# Patient Record
Sex: FEMALE | Race: WHITE
[De-identification: names, ages, dates, MRNs, and addresses within clinical notes are randomized per-mention and may not be internally consistent; named-entity substitution may affect disease eponyms.]

---

## 2021-02-25 ENCOUNTER — HOSPITAL ENCOUNTER (OUTPATIENT)
Dept: HOSPITAL 50 - VM.MS | Age: 51
Setting detail: OBSERVATION
LOS: 1 days | Discharge: HOME | End: 2021-02-26
Attending: PHYSICIAN ASSISTANT | Admitting: PHYSICIAN ASSISTANT
Payer: MEDICAID

## 2021-02-25 DIAGNOSIS — L40.9: ICD-10-CM

## 2021-02-25 DIAGNOSIS — K70.31: ICD-10-CM

## 2021-02-25 DIAGNOSIS — E87.6: Primary | ICD-10-CM

## 2021-02-25 DIAGNOSIS — Z79.899: ICD-10-CM

## 2021-02-25 DIAGNOSIS — M79.606: ICD-10-CM

## 2021-02-25 DIAGNOSIS — Z20.822: ICD-10-CM

## 2021-02-25 PROCEDURE — G0379 DIRECT REFER HOSPITAL OBSERV: HCPCS

## 2021-02-25 PROCEDURE — U0002 COVID-19 LAB TEST NON-CDC: HCPCS

## 2021-02-25 PROCEDURE — G0378 HOSPITAL OBSERVATION PER HR: HCPCS

## 2021-02-25 RX ADMIN — TRIAMCINOLONE ACETONIDE SCH APPLIC: 1 CREAM TOPICAL at 20:19

## 2021-02-25 RX ADMIN — POTASSIUM CHLORIDE SCH MEQ: 750 TABLET, FILM COATED, EXTENDED RELEASE ORAL at 17:10

## 2021-02-25 RX ADMIN — POTASSIUM CHLORIDE SCH MLS/HR: 29.8 INJECTION, SOLUTION INTRAVENOUS at 18:17

## 2021-02-25 RX ADMIN — POTASSIUM CHLORIDE SCH MLS/HR: 29.8 INJECTION, SOLUTION INTRAVENOUS at 17:04

## 2021-02-25 NOTE — PCM.HP.2
H&P History of Present Illness





- General


Date of Service: 02/25/21


Admit Problem/Dx: 


                           Admission Diagnosis/Problem





Admission Diagnosis/Problem      Hypokalemia








Source of Information: Patient


History Limitations: Reports: No Limitations





- History of Present Illness


Onset of Symptoms: Reports: Gradual


Duration of Symptoms: Reports: Chronic, Getting Worse


Location: Reports: Other


Quality: Reports: Ache


Severity: Severe


Improves with: Reports: None


Worsens with: Reports: None


Context: Reports: Other


Associated Symptoms: Reports: Loss of Appetite, Malaise, Nausea/Vomiting, Rash





- Related Data


Allergies/Adverse Reactions: 


                                    Allergies











Allergy/AdvReac Type Severity Reaction Status Date / Time


 


No Known Allergies Allergy   Verified 02/04/20 13:41











Home Medications: 


                                    Home Meds





Ibuprofen [Motrin] 600 mg PO Q4HR PRN 02/04/20 [History]


Triamcinolone Acetonide [Kenalog 0.1% Crm] 1 applic BID PRN 02/04/20 [History]











Past Medical History


Genitourinary History: Reports: Other (See Below)


Other Genitourinary History: dysfuncitonal uterine bleeding


Psychiatric History: Reports: Addiction


Dermatologic History: Reports: Other (See Below)


Other Dermatologic History: rash





Social & Family History





- Family History


Cardiac: Reports: Hypertension, Pacemaker


Respiratory: Reports: Sleep Apnea





- Living Situation & Occupation


Living situation: Reports: 


Occupation: Unemployed (living with parents currently)





H&P Review of Systems





- Review of Systems:


Review Of Systems: See Below


General: Reports: Malaise, Fatigue, Decreased Appetite, Weight Loss


HEENT: Reports: No Symptoms


Pulmonary: Reports: No Symptoms


Cardiovascular: Reports: No Symptoms


Gastrointestinal: Reports: Abdominal Pain, Anorexia, Decreased Appetite, 

Distension, Nausea


Genitourinary: Reports: No Symptoms


Musculoskeletal: Reports: Leg Pain


Skin: Reports: Jaundice, Pruritis, Rash


Psychiatric: Reports: Other (alcohol abuse)


Neurological: Reports: No Symptoms


Hematologic/Lymphatic: Reports: Easy Bleeding, Other (epistaxis)


Immunologic: Reports: No Symptoms





Exam





- Exam


Exam: See Below





- Vital Signs


Vital Signs: 


                                Last Vital Signs











Temp  36.7 C   02/25/21 16:21


 


Pulse  88   02/25/21 16:21


 


Resp  16   02/25/21 16:21


 


BP  105/60   02/25/21 16:21


 


Pulse Ox  100   02/25/21 16:21











Weight: 55.61 kg





- Exam


General: Alert, Oriented, Cooperative


HEENT: Conjunctiva Clear, EACs Clear, Hearing Intact


Neck: Supple, Trachea Midline


Lungs: Clear to Auscultation, Normal Respiratory Effort


Cardiovascular: Regular Rate, Regular Rhythm, Normal S1, Normal S2


GI/Abdominal Exam: Normal Bowel Sounds, Soft, Distended, Hepatomegaly


Extremities: Normal Inspection, Normal Range of Motion, Non-Tender, No Pedal 

Edema, Normal Capillary Refill, Mottled


Peripheral Pulses: 2+: Dorsalis Pedis (L), Dorsalis Pedis (R)


Skin: Warm, Dry, Intact, Rash


Neurological: Normal Gait, Normal Speech


Neuro Extensive - Mental Status: Alert, Oriented x3, Normal Mood/Affect, Normal 

Cognition, Memory Intact


Neuro Extensive - Motor, Sensory, Reflexes: Normal Gait


Psychiatric: Alert, Normal Affect, Normal Mood





Sepsis Event Note





- Focused Exam


Vital Signs: 


                                   Vital Signs











  Temp Pulse Resp BP Pulse Ox


 


 02/25/21 16:21  36.7 C  88  16  105/60  100














*Q Meaningful Use (ADM)





- VTE *Q


VTE Anticoagulation Contraindications: Med/TX Not Indicated/Need





- Problem List


(1) Hypokalemia


SNOMED Code(s): 58187762


   ICD Code: E87.6 - HYPOKALEMIA   Status: Acute   Priority: High   Current 

Visit: Yes   





(2) Cirrhosis of liver


SNOMED Code(s): 96754862


   ICD Code: K74.60 - UNSPECIFIED CIRRHOSIS OF LIVER   Status: Chronic   

Priority: Medium   Current Visit: Yes   


Qualifiers: 


   Hepatic cirrhosis type: alcoholic cirrhosis   Ascites presence: with ascites 

  Qualified Code(s): K70.31 - Alcoholic cirrhosis of liver with ascites   





(3) Leg pain, diffuse


SNOMED Code(s): 60624957


   ICD Code: M79.606 - PAIN IN LEG, UNSPECIFIED   Status: Chronic   Priority: 

Medium   Current Visit: Yes   


Qualifiers: 


   Laterality: unspecified laterality   Qualified Code(s): M79.606 - Pain in 

leg, unspecified   





(4) Psoriasis


SNOMED Code(s): 4749169


   ICD Code: L40.9 - PSORIASIS, UNSPECIFIED   Status: Chronic   Priority: Low   

Current Visit: Yes   


Problem List Initiated/Reviewed/Updated: Yes


Orders Last 24hrs: 


                               Active Orders 24 hr











 Category Date Time Status


 


 Patient Status [ADT] Routine ADT  02/25/21 16:15 Ordered


 


 Cardiac Monitoring [RC] CONTINUOUS Care  02/25/21 16:17 Ordered


 


 EKG 12 Lead [EKG Documentation Completion] [RC] STAT Care  02/25/21 16:21 

Ordered


 


 Notify Provider Vital Signs [RC] ASDIRECTED Care  02/25/21 16:17 Ordered


 


 Oxygen Therapy [RC] PRN Care  02/25/21 16:15 Ordered


 


 Up ad Fide [RC] ASDIRECTED Care  02/25/21 16:15 Ordered


 


 VTE/DVT Education [RC] PER UNIT ROUTINE Care  02/25/21 16:15 Ordered


 


 Vital Signs [RC] Q4H Care  02/25/21 16:15 Ordered


 


 Regular Diet [DIET] Diet  02/25/21 Dinner Ordered


 


 CBC WITH AUTO DIFF [HEME] AM Lab  02/26/21 05:11 Ordered


 


 COMPREHENSIVE METABOLIC PN,CMP [CHEM] AM Lab  02/26/21 05:11 Ordered


 


 CULTURE BLOOD [BC] Stat Lab  02/25/21 16:20 Ordered


 


 CULTURE BLOOD [BC] Stat Lab  02/25/21 16:20 Ordered


 


 POTASSIUM,K [CHEM] Timed Lab  02/25/21 22:00 Ordered


 


 Acetaminophen [TylenoL] Med  02/25/21 16:15 Ordered





 650 mg PO Q4H PRN   


 


 Folic Acid Med  02/26/21 08:00 Ordered





 1 mg PO DAILY   


 


 Gabapentin [Neurontin] Med  02/25/21 20:00 Ordered





 300 mg PO BID   


 


 Nicotine [Habitrol] Med  02/26/21 08:00 Ordered





 21 mg TRDERM DAILY   


 


 Potassium Chloride Riders [KCL in Water 20 MEQ/50 ML] Med  02/25/21 16:30 

Ordered





 20 meq   





 Premix Bag 1 bag   





 IV Q2H   


 


 Potassium Chloride [Klor-Con 10] Med  02/25/21 18:00 Ordered





 40 meq PO BIDMEALS   


 


 Sodium Chloride 0.9% @ 150 MLS/HR (1000ml) Med  02/25/21 16:30 Ordered





 Sodium Chloride 0.9% [Normal Saline] 1,000 ml   





 IV ASDIRECTED   


 


 Sodium Chloride 0.9% [Saline Flush] Med  02/25/21 16:15 Ordered





 10 ml FLUSH ASDIRECTED PRN   


 


 Thiamine [Vitamin B-1] Med  02/25/21 20:00 Ordered





 100 mg PO BEDTIME   


 


 Triamcinolone Acetonide [Triamcinolone Acetonide 0.1% Med  02/25/21 20:00 

Ordered





 Crm]   





 15 gm TOP BID   


 


 Anticoagulation Contraindications VTE [AST] Per Unit Oth  02/25/21 16:15 

Ordered





 Routine   


 


 Blood Culture x2 Reflex Set [OM.PC] Stat Ot  02/25/21 16:15 Ordered


 


 Saline Lock Insert [OM.PC] Routine Ot  02/25/21 16:15 Ordered


 


 Resuscitation Status Routine Resus Stat  02/25/21 16:15 Ordered








                                Medication Orders





Acetaminophen (Tylenol)  650 mg PO Q4H PRN


   PRN Reason: Pain (Mild 1-3)/fever


Folic Acid (Folic Acid)  1 mg PO DAILY [facility]


   Last Admin: 02/25/21 17:10 Dose:  1 mg


   Documented by: 


Gabapentin (Neurontin)  300 mg PO BID [facility]


Potassium Chloride 20 meq/ (Premix)  50 mls @ 50 mls/hr IV Q2H [facility]


   Stop: 02/25/21 19:29


   Last Admin: 02/25/21 17:04 Dose:  50 mls/hr


   Documented by: 


Sodium Chloride (Normal Saline)  1,000 mls @ 150 mls/hr IV ASDIRECTED [facility]


   Last Admin: 02/25/21 16:45  Dose: 150 mls/hr


   Documented by: DEPRROW


Nicotine (Habitrol)  21 mg TRDERM DAILY@1800 [facility]


   Last Admin: 02/25/21 17:03 Dose:  21 mg


   Documented by: 


Potassium Chloride (Klor-Con 10)  40 meq PO BIDMEALS [facility]


   Last Admin: 02/25/21 17:10 Dose:  40 meq


   Documented by: 


Sodium Chloride (Saline Flush)  10 ml FLUSH ASDIRECTED PRN


   PRN Reason: Keep Vein Open


Thiamine HCl (Vitamin B-1)  100 mg PO DAILY [facility]


   Last Admin: 02/25/21 17:10 Dose:  100 mg


   Documented by: 


Triamcinolone Acetonide (Triamcinolone Acetonide 0.1% Crm)  0 gm TOP BID [facility]








Assessment/Plan Comment:: 





She is admitted Observation Status to replace potassium and monitor her 

telemetry while doing so. 


KCL 20 mEq IV x 2 tonight.


KCL 40 mEq orally BID (2 doses tonight).


Check Potassium level at 10 pm and repeat lab in AM. 


EKG


Start Gabapentin for leg pain. 


Blood cultures.


Get RUQ US on out pt if not available now. 


I contacted [facility] who had no further recommendations. Stop a

lcohol and recheck lab in 2 weeks. 


Anticipate DC tomorrow on oral K.

## 2021-02-25 NOTE — PCM.HP.2
H&P History of Present Illness





- General


Date of Service: 02/25/21


Admit Problem/Dx: 


                           Admission Diagnosis/Problem





Admission Diagnosis/Problem      Hypokalemia








Source of Information: Patient


History Limitations: Reports: No Limitations





- History of Present Illness


Initial Comments - Free Text/Narative: 





Jazmín is a 50 yr old female who came into the clinic today for a wellness exam. S

he had complaints of: bilateral leg aching keeping her awake at night, bloating 

to abdomen with loss of appetite and post prandial nausea, pruritic rash to 

back, and weight loss. She has known alcoholism and she reports stopping 

drinking 2 weeks ago. On exam she has jaundice, hepatomegaly, ascites, and 

dermatitis. Lab studies show: PT 15.5, INR 1.4, Lipase 172, Blood Alcohol 0, 

Gluc 123, K 2.5, Na 137, Albumin 2.8, Alk Phos 228, , ALT 22, Total Bili 

7.6, renal function WNL, Mag 1.7, WBC 11.1, RBC 2.8, Hgb 10.3, .7, MCHC 

34. Pending labs include GGTP, Vit B12, Ferritin, TSH, Vit D. 


She moved to [place] from [place] to live with her parents. Today she 

indicates she is moving to [place] to live with her sister. She hopes to work in

the insurance industry. She has a dog she is concerned about his care as he is 

over 80 lbs in the care of her elderly parents tonight. 


Onset of Symptoms: Reports: Gradual


Duration of Symptoms: Reports: Chronic, Getting Worse


Location: Reports: Other


Quality: Reports: Ache


Severity: Moderate


Improves with: Reports: None


Worsens with: Reports: None


Context: Reports: Other


Associated Symptoms: Reports: Loss of Appetite, Nausea/Vomiting, Rash





- Related Data


Allergies/Adverse Reactions: 


                                    Allergies











Allergy/AdvReac Type Severity Reaction Status Date / Time


 


No Known Allergies Allergy   Verified 02/04/20 13:41











Home Medications: 


                                    Home Meds





Ibuprofen [Motrin] 600 mg PO Q4HR PRN 02/04/20 [History]


Triamcinolone Acetonide [Kenalog 0.1% Crm] 1 applic BID PRN 02/04/20 [History]











Past Medical History


Genitourinary History: Reports: Other (See Below)


Other Genitourinary History: dysfuncitonal uterine bleeding


Psychiatric History: Reports: Addiction


Dermatologic History: Reports: Other (See Below)


Other Dermatologic History: rash





Social & Family History





- Family History


Cardiac: Reports: Hypertension, Pacemaker


Respiratory: Reports: Sleep Apnea





- Living Situation & Occupation


Living situation: Reports: 


Occupation: Unemployed (living with parents currently)





H&P Review of Systems





- Review of Systems:


Review Of Systems: See Below


General: Reports: Malaise, Decreased Appetite, Weight Loss


HEENT: Reports: No Symptoms


Pulmonary: Reports: No Symptoms


Cardiovascular: Reports: No Symptoms


Gastrointestinal: Reports: Abdominal Pain, Decreased Appetite, Distension, 

Nausea


Genitourinary: Reports: No Symptoms


Musculoskeletal: Reports: Leg Pain


Skin: Reports: Jaundice


Psychiatric: Reports: Other (alcohol abuse, stopped 2 weeks ago)


Neurological: Reports: No Symptoms


Hematologic/Lymphatic: Reports: Other (epistaxis)


Immunologic: Reports: No Symptoms





Exam





- Exam


Exam: See Below





- Exam


General: Alert, Oriented, Cooperative


HEENT: Conjunctiva Clear, EACs Clear, Hearing Intact, Mucosa Moist & Pink


Neck: Supple, Trachea Midline


Lungs: Clear to Auscultation, Normal Respiratory Effort


Cardiovascular: Regular Rate, Regular Rhythm, Normal S1, Normal S2


GI/Abdominal Exam: Normal Bowel Sounds, Soft, Distended, Hepatomegaly


Extremities: Normal Inspection, Normal Range of Motion, Non-Tender, No Pedal 

Edema, Normal Capillary Refill, Leg Pain, Mottled


Peripheral Pulses: 2+: Dorsalis Pedis (L), Dorsalis Pedis (R)


Skin: Warm, Dry, Intact


Neurological: Normal Gait, Normal Speech


Neuro Extensive - Mental Status: Alert, Oriented x3, Normal Mood/Affect, Normal 

Cognition, Memory Intact


Neuro Extensive - Motor, Sensory, Reflexes: Normal Gait





*Q Meaningful Use (ADM)





- VTE *Q


VTE Anticoagulation Contraindications: Med/TX Not Indicated/Need





- Problem List


(1) Hypokalemia


SNOMED Code(s): 69627320


   ICD Code: E87.6 - HYPOKALEMIA   Status: Acute   Priority: High   Current 

Visit: Yes   





(2) Cirrhosis of liver


SNOMED Code(s): 58447132


   ICD Code: K74.60 - UNSPECIFIED CIRRHOSIS OF LIVER   Status: Chronic   

Priority: Medium   Current Visit: Yes   


Qualifiers: 


   Hepatic cirrhosis type: alcoholic cirrhosis   Ascites presence: with ascites 

  Qualified Code(s): K70.31 - Alcoholic cirrhosis of liver with ascites   





(3) Leg pain, diffuse


SNOMED Code(s): 42803664


   ICD Code: M79.606 - PAIN IN LEG, UNSPECIFIED   Status: Chronic   Priority: 

Medium   Current Visit: Yes   


Qualifiers: 


   Laterality: unspecified laterality   Qualified Code(s): M79.606 - Pain in 

leg, unspecified   





(4) Psoriasis


SNOMED Code(s): 3142942


   ICD Code: L40.9 - PSORIASIS, UNSPECIFIED   Status: Chronic   Priority: Low   

Current Visit: Yes   


Problem List Initiated/Reviewed/Updated: Yes


Orders Last 24hrs: 


                               Active Orders 24 hr











 Category Date Time Status


 


 Patient Status [ADT] Routine ADT  02/25/21 16:15 Ordered


 


 Cardiac Monitoring [RC] CONTINUOUS Care  02/25/21 16:17 Ordered


 


 EKG 12 Lead [EKG Documentation Completion] [RC] STAT Care  02/25/21 16:21 

Ordered


 


 Notify Provider Vital Signs [RC] ASDIRECTED Care  02/25/21 16:17 Ordered


 


 Oxygen Therapy [RC] PRN Care  02/25/21 16:15 Ordered


 


 Up ad Fide [RC] ASDIRECTED Care  02/25/21 16:15 Ordered


 


 VTE/DVT Education [RC] PER UNIT ROUTINE Care  02/25/21 16:15 Ordered


 


 Vital Signs [RC] Q4H Care  02/25/21 16:15 Ordered


 


 Regular Diet [DIET] Diet  02/25/21 Dinner Ordered


 


 CBC WITH AUTO DIFF [HEME] AM Lab  02/26/21 05:11 Ordered


 


 COMPREHENSIVE METABOLIC PN,CMP [CHEM] AM Lab  02/26/21 05:11 Ordered


 


 CULTURE BLOOD [BC] Stat Lab  02/25/21 16:20 Ordered


 


 CULTURE BLOOD [BC] Stat Lab  02/25/21 16:20 Ordered


 


 POTASSIUM,K [CHEM] Timed Lab  02/25/21 22:00 Ordered


 


 Acetaminophen [TylenoL] Med  02/25/21 16:15 Ordered





 650 mg PO Q4H PRN   


 


 Folic Acid Med  02/26/21 08:00 Ordered





 1 mg PO DAILY   


 


 Gabapentin [Neurontin] Med  02/25/21 20:00 Ordered





 300 mg PO BID   


 


 Nicotine [Habitrol] Med  02/26/21 08:00 Ordered





 21 mg TRDERM DAILY   


 


 Potassium Chloride Riders [KCL in Water 20 MEQ/50 ML] Med  02/25/21 16:30 

Ordered





 20 meq   





 Premix Bag 1 bag   





 IV Q2H   


 


 Potassium Chloride [Klor-Con 10] Med  02/25/21 18:00 Ordered





 40 meq PO BIDMEALS   


 


 Sodium Chloride 0.9% @ 150 MLS/HR (1000ml) Med  02/25/21 16:30 Ordered





 Sodium Chloride 0.9% [Normal Saline] 1,000 ml   





 IV ASDIRECTED   


 


 Sodium Chloride 0.9% [Saline Flush] Med  02/25/21 16:15 Ordered





 10 ml FLUSH ASDIRECTED PRN   


 


 Thiamine [Vitamin B-1] Med  02/25/21 20:00 Ordered





 100 mg PO BEDTIME   


 


 Triamcinolone Acetonide [Triamcinolone Acetonide 0.1% Med  02/25/21 20:00 

Ordered





 Crm]   





 15 gm TOP BID   


 


 Anticoagulation Contraindications VTE [AST] Per Unit Oth  02/25/21 16:15 

Ordered





 Routine   


 


 Blood Culture x2 Reflex Set [OM.PC] Stat Ot  02/25/21 16:15 Ordered


 


 Saline Lock Insert [OM.PC] Routine Oth  02/25/21 16:15 Ordered


 


 Resuscitation Status Routine Resus Stat  02/25/21 16:15 Ordered








                                Medication Orders





Acetaminophen (Tylenol)  650 mg PO Q4H PRN


   PRN Reason: Pain (Mild 1-3)/fever


Folic Acid (Folic Acid)  1 mg PO DAILY [facility]


Gabapentin (Neurontin)  300 mg PO BID [facility]


Potassium Chloride 20 meq/ (Premix)  50 mls @ 50 mls/hr IV Q2H [facility]


   Stop: 02/25/21 19:29


Sodium Chloride (Normal Saline)  1,000 mls @ 150 mls/hr IV ASDIRECTED [facility]


   Last Admin: 02/25/21 16:45  Dose: 150 mls/hr


   Documented by: DEPRROW


Nicotine (Habitrol)  21 mg TRDERM DAILY@1800 [facility]


Potassium Chloride (Klor-Con 10)  40 meq PO BIDMEALS [facility]


Sodium Chloride (Saline Flush)  10 ml FLUSH ASDIRECTED PRN


   PRN Reason: Keep Vein Open


Thiamine HCl (Vitamin B-1)  100 mg PO DAILY [facility]


Triamcinolone Acetonide (Triamcinolone Acetonide 0.1% Crm)  0 gm TOP BID [facility]








Assessment/Plan Comment:: 





She is admitted Observation Status to replace potassium and monitor her 

telemetry while doing so. 


KCL 20 mEq IV x 2 tonight.


KCL 40 mEq orally BID (2 doses tonight).


Check Potassium level at 10 pm and repeat lab in AM. 


EKG


Start Gabapentin for leg pain. 


Blood cultures.


Get RUQ US on out pt if not available now. 


I contacted [facility] who had no further recommendations. Stop 

alcohol and recheck lab in 2 weeks. 


Anticipate DC tomorrow on oral K. 








- Mortality Measure


Prognosis:: Good

## 2021-02-26 LAB
ANION GAP SERPL CALC-SCNC: 11 MMOL/L (ref 5–15)
CHLORIDE SERPL-SCNC: 102 MMOL/L (ref 98–107)
SODIUM SERPL-SCNC: 137 MMOL/L (ref 136–145)

## 2021-02-26 RX ADMIN — POTASSIUM CHLORIDE SCH MEQ: 750 TABLET, FILM COATED, EXTENDED RELEASE ORAL at 07:43

## 2021-02-26 RX ADMIN — TRIAMCINOLONE ACETONIDE SCH APPLIC: 1 CREAM TOPICAL at 07:43

## 2021-02-26 NOTE — PCM.DCSUM1
**Discharge Summary





- Hospital Course


Free Text/Narrative:: 





Jazmín was admitted overnight with hypokalemia, alcoholic cirrhosis, leg pain, and 

itching. She was given Potassium both infusion and orally. Her potassium chrissy to

3.5 at 10 pm and 4.0 this morning. She was started on Gabapentin for leg pain 

which she felt was helpful. She feels she can stop alcohol use on her own. She 

indicates stopping 2 weeks ago.  did visit with her and offered 

her business card if she changes her mind about alcohol treatment. 


She will have an Abdominal US on Monday along with a repeat K level. I will see 

her in 2 weeks with repeat lab. She is referred to Gastroenterology. 


She does have anemia with macrocytosis. Ferritin and Vitamin B12 levels are 

elevated. Will get iron studies outpatient. Ammonia level drawn this morning and

pending. She is given Lactulose 15 ml daily for ascites and constipation, Ativan

10 tablets in the event of onset of withdrawal symptoms this weekend with 

instructions she needs to be seen if occurs, Potassium 10 mEq daily, and will 

increase Gabapentin to 300 mg TID. She requests a Nicoderm 24 mg/day patch to 

continue so Rx sent. She was DC to her parents care. 





HPI Initial Comments: 





Hypokalemia and alcohol cirrhosis


Brief History: Hypokalemia and alcohol cirrhosis.


Diagnosis: Stroke: No


Modified [place] Scale: No Signif.Disability Despite Sympt.Able to Carry Out 

Usual Act./Duties


Modified Vito Scale Score: 1





- Discharge Data


Discharge Date: 02/26/21


Discharge Disposition: Home, Self-Care 01


Condition: Fair





- Referral to Home Health


Primary Care Physician: 


Bobbi Montanez PA-C








- Discharge Diagnosis/Problem(s)


(1) Hypokalemia


SNOMED Code(s): 72836807


   ICD Code: E87.6 - HYPOKALEMIA   Status: Acute   Priority: High   





(2) Cirrhosis of liver


SNOMED Code(s): 92231075


   ICD Code: K74.60 - UNSPECIFIED CIRRHOSIS OF LIVER   Status: Chronic   

Priority: Medium   


Qualifiers: 


   Hepatic cirrhosis type: alcoholic cirrhosis   Ascites presence: with ascites 

 Qualified Code(s): K70.31 - Alcoholic cirrhosis of liver with ascites   





(3) Leg pain, diffuse


SNOMED Code(s): 78412502


   ICD Code: M79.606 - PAIN IN LEG, UNSPECIFIED   Status: Chronic   Priority: 

Medium   


Qualifiers: 


   Laterality: unspecified laterality   Qualified Code(s): M79.606 - Pain in 

leg, unspecified   





(4) Psoriasis


SNOMED Code(s): 0872035


   ICD Code: L40.9 - PSORIASIS, UNSPECIFIED   Status: Chronic   Priority: Low   





- Patient Instructions


Diet: Usual Diet as Tolerated


Diet, Other: Encourage high potassium diet such as orange juice, bananas, etc.


Activity: As Tolerated


Notify Provider of: Increased Pain, Nausea and/or Vomiting


Other/Special Instructions: Needs repeat potassium level on Monday at clinic.  

Needs repeat office visit in 2 weeks with full lab one day prior.  See 

Gastroenterologist in [place]. I will schedule through here.  Take Lactulose 15 ml

each day. This helps with getting bowels working and to reduce fluid in belly.  

Increase Gabapentin to 300 mg 3 times a day.  Have Ultrasound of abdomen on 

Monday at hospital.  Continue to use nicotine patches at home to avoid tobacco. 

No alcohol.  If tylenol is used, no more than 2000 mg per 24 hours.  Ativan 0.5 

mg up to 2 times a day as needed for symptoms of possible detox (jittery, heart 

racing, nervousness, etc.) If this occurs should also be seen.  Potassium 10 mEq

once a day.





- Discharge Plan


*PRESCRIPTION DRUG MONITORING PROGRAM REVIEWED*: No


*COPY OF PRESCRIPTION DRUG MONITORING REPORT IN PATIENT SHAHEED: No


Prescriptions/Med Rec: 


LORazepam [Ativan] 0.5 mg PO BID PRN #10 tablet


 PRN Reason: jittery, nervousness


Nicotine [Habitrol] 21 mg TRDERM DAILY@1800 #30 patch


Potassium Chloride [Klor-Con 10] 10 meq PO DAILY #30 tab.er


Tobacco Cessation Medication: Prescription Given


Home Medications: 


                                    Home Meds





Triamcinolone Acetonide [Kenalog 0.1% Crm] 1 applic TOP BID PRN 02/04/20 

[History]


Betamethasone Dipropionate [Diprosone 0.05% Crm] 1 applic TOP BID PRN 02/25/21 

[History]


Betamethasone Dipropionate [Diprosone 0.05% Oint] 1 applic TOP BID 02/25/21 

[History]


Non-Formulary Medication [NF Drug] 1 gm VAG ASDIRECTED 02/25/21 [History]


Folic Acid 1 mg PO DAILY  tablet 02/26/21 [Rx]


Gabapentin [Neurontin] 300 mg PO TID  cap 02/26/21 [Rx]


LORazepam [Ativan] 0.5 mg PO BID PRN #10 tablet 02/26/21 [Rx]


Nicotine [Habitrol] 21 mg TRDERM DAILY@1800 #30 patch 02/26/21 [Rx]


Potassium Chloride [Klor-Con 10] 10 meq PO DAILY #30 tab.er 02/26/21 [Rx]


Thiamine [Vitamin B-1] 100 mg PO DAILY  tablet 02/26/21 [Rx]








Oxygen Therapy Mode: Room Air


Patient Handouts:  Hypokalemia





- Discharge Summary/Plan Comment


DC Time >30 min.: Yes





- General Info


Date of Service: 02/26/21


Functional Status: Reports: Pain Controlled, Tolerating Diet, Ambulating, 

Urinating





- Review of Systems


General: Reports: No Symptoms


HEENT: Reports: No Symptoms


Pulmonary: Reports: No Symptoms


Cardiovascular: Reports: No Symptoms


Gastrointestinal: Reports: No Symptoms


Genitourinary: Reports: No Symptoms


Musculoskeletal: Reports: Leg Pain


Skin: Reports: Pruritis


Neurological: Reports: No Symptoms


Psychiatric: Reports: No Symptoms





- Patient Data


Vitals - Most Recent: 


                                Last Vital Signs











Temp  36.6 C   02/26/21 10:00


 


Pulse  84   02/26/21 10:00


 


Resp  16   02/26/21 10:00


 


BP  90/51 L  02/26/21 10:00


 


Pulse Ox  100   02/26/21 10:00











Weight - Most Recent: 55.61 kg


I&O - Last 24 hours: 


                                 Intake & Output











 02/25/21 02/26/21 02/26/21





 22:59 06:59 14:59


 


Intake Total 610 150 360


 


Output Total 20 300 


 


Balance 590 -150 360











Lab Results - Last 24 hrs: 


                         Laboratory Results - last 24 hr











  02/25/21 02/25/21 02/26/21 Range/Units





  16:38 21:57 06:30 


 


WBC    9.4  (4.0-10.0)  x10^3/uL


 


RBC    2.57 L  (4.00-5.50)  x10^6/uL


 


Hgb    9.4 L  (12.0-16.0)  g/dL


 


Hct    27.1 L  (33.0-47.0)  %


 


MCV    105.4 H  (78.0-93.0)  fL


 


MCH    36.6 H  (26.0-32.0)  pg


 


MCHC    34.7  (32.0-36.0)  g/dL


 


RDW Coeff of Ana    17.3 H  (10.0-15.0)  %


 


Plt Count    133  (130-400)  x10^3/uL


 


Neut % (Auto)    76.0  (50.0-80.0)  %


 


Lymph % (Auto)    11.2 L  (25.0-50.0)  %


 


Mono % (Auto)    11.0  (2.0-11.0)  %


 


Eos % (Auto)    1.6  (0.0-4.0)  %


 


Baso % (Auto)    0.2  (0.2-1.2)  %


 


Sodium     (136-145)  mmol/L


 


Potassium   3.5   (3.5-5.1)  mmol/L


 


Chloride     ()  mmol/L


 


Carbon Dioxide     (21-32)  mmol/L


 


Anion Gap     (5-15)  mmol/L


 


BUN     (7-18)  mg/dL


 


Creatinine     (0.55-1.02)  mg/dL


 


Est Cr Clr Drug Dosing     mL/min


 


Estimated GFR (MDRD)     


 


Glucose     ()  mg/dL


 


Calcium     (8.5-10.1)  mg/dL


 


Corrected Calcium     (8.5-10.1)  mg/dL


 


Total Bilirubin     (0.2-1.0)  mg/dL


 


AST     (15-37)  U/L


 


ALT     (14-59)  U/L


 


Alkaline Phosphatase     ()  U/L


 


Total Protein     (6.4-8.2)  g/dL


 


Albumin     (3.4-5.0)  g/dL


 


Globulin     


 


Albumin/Globulin Ratio     


 


SARS CoV-2 RNA Rapid BRADLEY  Negative    (NEGATIVE)  














  02/26/21 Range/Units





  06:30 


 


WBC   (4.0-10.0)  x10^3/uL


 


RBC   (4.00-5.50)  x10^6/uL


 


Hgb   (12.0-16.0)  g/dL


 


Hct   (33.0-47.0)  %


 


MCV   (78.0-93.0)  fL


 


MCH   (26.0-32.0)  pg


 


MCHC   (32.0-36.0)  g/dL


 


RDW Coeff of Ana   (10.0-15.0)  %


 


Plt Count   (130-400)  x10^3/uL


 


Neut % (Auto)   (50.0-80.0)  %


 


Lymph % (Auto)   (25.0-50.0)  %


 


Mono % (Auto)   (2.0-11.0)  %


 


Eos % (Auto)   (0.0-4.0)  %


 


Baso % (Auto)   (0.2-1.2)  %


 


Sodium  137  (136-145)  mmol/L


 


Potassium  4.0  (3.5-5.1)  mmol/L


 


Chloride  102  ()  mmol/L


 


Carbon Dioxide  28  (21-32)  mmol/L


 


Anion Gap  11.0  (5-15)  mmol/L


 


BUN  12  (7-18)  mg/dL


 


Creatinine  0.9  (0.55-1.02)  mg/dL


 


Est Cr Clr Drug Dosing  63.14  mL/min


 


Estimated GFR (MDRD)  > 60  


 


Glucose  83  ()  mg/dL


 


Calcium  8.5  (8.5-10.1)  mg/dL


 


Corrected Calcium  9.94  (8.5-10.1)  mg/dL


 


Total Bilirubin  6.6 H  (0.2-1.0)  mg/dL


 


AST  107 H  (15-37)  U/L


 


ALT  23  (14-59)  U/L


 


Alkaline Phosphatase  222 H  ()  U/L


 


Total Protein  7.0  (6.4-8.2)  g/dL


 


Albumin  2.2 L  (3.4-5.0)  g/dL


 


Globulin  4.8  


 


Albumin/Globulin Ratio  0.46  


 


SARS CoV-2 RNA Rapid BRADLEY   (NEGATIVE)  











Med Orders - Current: 


                               Current Medications








Discontinued Medications





Acetaminophen (Tylenol)  650 mg PO Q4H PRN


   PRN Reason: Pain (Mild 1-3)/fever


   Last Admin: 02/26/21 05:09 Dose:  650 mg


   Documented by: 


Folic Acid (Folic Acid)  1 mg PO DAILY [facility]


   Last Admin: 02/26/21 07:43 Dose:  1 mg


   Documented by: 


Gabapentin (Neurontin)  300 mg PO BID [facility]


   Last Admin: 02/26/21 07:43 Dose:  300 mg


   Documented by: 


Potassium Chloride 20 meq/ (Premix)  50 mls @ 50 mls/hr IV Q2H [facility]


   Stop: 02/25/21 19:29


   Last Admin: 02/25/21 18:17 Dose:  50 mls/hr


   Documented by: 


Sodium Chloride (Normal Saline)  1,000 mls @ 150 mls/hr IV ASDIRECTED [facility]


   Last Admin: 02/25/21 16:45 Dose:  150 mls/hr


   Documented by: 


Ibuprofen (Motrin)  600 mg PO Q6HR PRN


   PRN Reason: Pain


Nicotine (Habitrol)  21 mg TRDERM DAILY@1800 [facility]


   Last Admin: 02/25/21 17:03 Dose:  21 mg


   Documented by: 


Potassium Chloride (Klor-Con 10)  40 meq PO BIDMEALS [facility]


   Last Admin: 02/26/21 07:43 Dose:  40 meq


   Documented by: 


Sodium Chloride (Saline Flush)  10 ml FLUSH ASDIRECTED PRN


   PRN Reason: Keep Vein Open


   Last Admin: 02/25/21 20:21 Dose:  10 ml


   Documented by: 


Thiamine HCl (Vitamin B-1)  100 mg PO DAILY [facility]


   Last Admin: 02/26/21 07:43 Dose:  100 mg


   Documented by: 


Triamcinolone Acetonide (Triamcinolone Acetonide 0.1% Crm)  0 gm TOP BID [facility]


   Last Admin: 02/26/21 07:43 Dose:  1 applic


   Documented by: 











- Exam


General: Reports: Alert, Oriented, Cooperative


HEENT: Reports: Mucous Membr. Moist/Pink


Lungs: Reports: Clear to Auscultation, Normal Respiratory Effort


Cardiovascular: Reports: Regular Rate, Regular Rhythm


GI/Abdominal Exam: Normal Bowel Sounds, Soft, Distended, Hepatomegaly


Extremities: Normal Inspection


Skin: Reports: Warm, Dry


Neurological: Reports: No New Focal Deficit


Psy/Mental Status: Reports: Alert, Normal Affect, Normal Mood





*Q Meaningful Use (DIS)





- VTE *Q


VTE Anticoagulation Contraindications: Med/TX Not Indicated/Need

## 2021-03-15 ENCOUNTER — HOSPITAL ENCOUNTER (EMERGENCY)
Dept: HOSPITAL 50 - VM.ED | Age: 51
Discharge: HOME | End: 2021-03-15
Payer: MEDICAID

## 2021-03-15 DIAGNOSIS — Z79.899: ICD-10-CM

## 2021-03-15 DIAGNOSIS — Z72.0: ICD-10-CM
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
## 2021-03-15 DIAGNOSIS — S81.811A: Primary | ICD-10-CM

## 2021-03-16 NOTE — EDM.PDOC
ED HPI GENERAL MEDICAL PROBLEM





- General


Chief Complaint: Skin Complaint


Stated Complaint: GENERAL


Time Seen by Provider: 03/15/21 22:45


Source of Information: Reports: Patient


History Limitations: Reports: No Limitations





- History of Present Illness


INITIAL COMMENTS - FREE TEXT/NARRATIVE: 





Pt. states that she cut her leg when she was shaving it earlier in the AM. She 

states that she has been unable to fully control the bleeding. She is not 

anticoagulated, but has liver disease and has problems with clotting. Last INR 

was 1.3. Denies injury elsewhere. Tetanus is up to date.





Onset: Today


Onset Date: 03/16/21


Location: Reports: Lower Extremity, Right





- Related Data


                                    Allergies











Allergy/AdvReac Type Severity Reaction Status Date / Time


 


No Known Allergies Allergy   Verified 02/04/20 13:41











Home Meds: 


                                    Home Meds





Triamcinolone Acetonide [Kenalog 0.1% Crm] 1 applic TOP BID PRN 02/04/20 

[History]


Betamethasone Dipropionate [Diprosone 0.05% Crm] 1 applic TOP BID PRN 02/25/21 

[History]


Betamethasone Dipropionate [Diprosone 0.05% Oint] 1 applic TOP BID 02/25/21 

[History]


Non-Formulary Medication [NF Drug] 1 gm VAG ASDIRECTED 02/25/21 [History]


Folic Acid 1 mg PO DAILY  tablet 02/26/21 [Rx]


Gabapentin [Neurontin] 300 mg PO TID  cap 02/26/21 [Rx]


LORazepam [Ativan] 0.5 mg PO BID PRN #10 tablet 02/26/21 [Rx]


Nicotine [Habitrol] 21 mg TRDERM DAILY@1800 #30 patch 02/26/21 [Rx]


Potassium Chloride [Klor-Con 10] 10 meq PO DAILY #30 tab.er 02/26/21 [Rx]


Thiamine [Vitamin B-1] 100 mg PO DAILY  tablet 02/26/21 [Rx]











Past Medical History


Gastrointestinal History: Reports: Cirrhosis


Genitourinary History: Reports: Other (See Below)


Other Genitourinary History: dysfuncitonal uterine bleeding


Psychiatric History: Reports: Addiction


Dermatologic History: Reports: Other (See Below)


Other Dermatologic History: rash





- Infectious Disease History


Infectious Disease History: Reports: Chicken Pox





Social & Family History





- Family History


Family Medical History: No Pertinent Family History


Cardiac: Reports: Hypertension, Pacemaker


Respiratory: Reports: Sleep Apnea





- Tobacco Use


Tobacco Use Status *Q: Current Every Day Tobacco User


Years of Tobacco use: 20


Packs/Tins Daily: 0.5





- Caffeine Use


Caffeine Use: Reports: Coffee, Soda





- Recreational Drug Use


Recreational Drug Use: No





- Living Situation & Occupation


Living situation: Reports: 


Occupation: Unemployed (living with parents currently)





ED ROS GENERAL





- Review of Systems


Review Of Systems: See Below


Constitutional: Reports: No Symptoms


HEENT: Reports: No Symptoms


Respiratory: Reports: No Symptoms


Cardiovascular: Reports: No Symptoms


Endocrine: Reports: No Symptoms


GI/Abdominal: Reports: No Symptoms


: Reports: No Symptoms


Musculoskeletal: Reports: Other (superficial laceration)


Skin: Reports: No Symptoms


Neurological: Reports: No Symptoms


Psychiatric: Reports: No Symptoms


Hematologic/Lymphatic: Reports: No Symptoms


Immunologic: Reports: No Symptoms





ED EXAM, SKIN/RASH


Exam: See Below


Exam Limited By: No Limitations


General Appearance: Alert, WD/WN, No Apparent Distress


Extremities: Other (subcentimeter laceration/skin tear to R anterior lower leg 

that continues to bleed. )





Course





- Vital Signs


Last Recorded V/S: 





                                Last Vital Signs











Temp  37.6 C   03/15/21 22:36


 


Pulse  96   03/15/21 22:36


 


Resp  18   03/15/21 22:36


 


BP  121/47 L  03/15/21 22:36


 


Pulse Ox  100   03/15/21 22:36














- Re-Assessments/Exams


Free Text/Narrative Re-Assessment/Exam: 





03/16/21 07:26


Collagen hemostatic dressing was placed on R lower leg and secured with coban. 

No further active bleeding noted.





Departure





- Departure


Time of Disposition: 23:15


Disposition: Home, Self-Care 01


Clinical Impression: 


 Laceration








- Discharge Information


Instructions:  Laceration Care, Adult


Referrals: 


Bobbi Montanez PA-C [Primary Care Provider] - 


Forms:  ED Department Discharge


Additional Instructions: 


Keep dressing on until Wednesday AM. 





Keep dry until then.





Return if it continues to bleed.





Sepsis Event Note (ED)





- Evaluation


Sepsis Screening Result: No Definite Risk





- Focused Exam


Vital Signs: 





                                   Vital Signs











  Temp Pulse Resp BP Pulse Ox


 


 03/15/21 22:36  37.6 C  96  18  121/47 L  100














- Assessment/Plan


Plan: 





Keep dressing on until Wednesday AM. 





Keep dry until then.





Return if it continues to bleed.

## 2021-05-20 ENCOUNTER — HOSPITAL ENCOUNTER (EMERGENCY)
Dept: HOSPITAL 50 - VM.ED | Age: 51
Discharge: HOME | End: 2021-05-20
Payer: MEDICAID

## 2021-05-20 DIAGNOSIS — R11.2: Primary | ICD-10-CM

## 2021-05-20 DIAGNOSIS — R19.7: ICD-10-CM

## 2021-05-20 LAB
ANION GAP SERPL CALC-SCNC: 12.4 MMOL/L (ref 5–15)
BARBITURATES UR QL SCN: NEGATIVE
BENZODIAZ UR QL SCN: NEGATIVE
CHLORIDE SERPL-SCNC: 101 MMOL/L (ref 98–107)
EDDP,URINE SCREEN: NEGATIVE
METHADONE UR QL SCN: NEGATIVE
SODIUM SERPL-SCNC: 132 MMOL/L (ref 136–145)
TCA SCREEN,URINE: NEGATIVE
THC UR QL SCN>50 NG/ML: NEGATIVE

## 2021-05-20 NOTE — EDM.PDOC
ED HPI GENERAL MEDICAL PROBLEM





- General


Chief Complaint: Gastrointestinal Problem


Stated Complaint: MED INCREASE


Time Seen by Provider: 05/20/21 11:50


Source of Information: Reports: Patient


History Limitations: Reports: No Limitations





- History of Present Illness


INITIAL COMMENTS - FREE TEXT/NARRATIVE: 





Pt. presents to ER with complaints of vomiting, diarrhea, nausea, and abdominal 

pain. Pt. states that she has been experiencing this for several days. She is on

lactulose and has a history of ascites. 


Pt. states that she has been unable to hold down fluids. She states that her 

diarrhea has been voluminous. Denies any recent hospitalizations. No recent use 

of antibiotics.


Pt. states that she also has low back pain and diffuse abdominal discomfort. It 

does not localize. She states that she has been taking oxycodone for this but 

only has 3 doses left, and states that the medication makes her nauseated. She 

denies any bloody stools. No melena or hematemesis. She denies any ill contacts.

She denies any chest pain, shortness of breath, palpitations. No headache. She 

complains of aching in her lower extremities.





Pt. states that she does have any appointment with GI tomorrow.


Onset Date: 05/17/21


Location: Reports: Abdomen, Back, Lower Extremity, Left, Lower Extremity, Right,

Generalized


Associated Symptoms: Reports: Nausea/Vomiting, Weakness


  ** Abdominal


Pain Score (Numeric/FACES): 10





- Related Data


                                    Allergies











Allergy/AdvReac Type Severity Reaction Status Date / Time


 


No Known Allergies Allergy   Verified 05/20/21 12:51











Home Meds: 


                                    Home Meds





Triamcinolone Acetonide [Kenalog 0.1% Crm] 1 applic TOP BID PRN 02/04/20 

[History]


Betamethasone Dipropionate [Diprosone 0.05% Oint] 1 applic TOP BID 02/25/21 

[History]


Nicotine [Habitrol] 21 mg TRDERM DAILY@1800 #30 patch 02/26/21 [Rx]


Clobetasol [Clobetasol Propionate 0.05%] 1 applic TOP BID 05/20/21 [History]


Famotidine 10 mg PO DAILY 05/20/21 [History]


Furosemide [Lasix] 20 mg PO DAILY 05/20/21 [History]


Gabapentin [Neurontin] 600 mg PO TID 05/20/21 [History]


Lactulose [Cephulac] 10 gm PO BID 05/20/21 [History]


Menthol/Methyl Salicylate [Icy Hot Cream] 1 applic TP ASDIRECTED 05/20/21 

[History]


Potassium Chloride [Klor-Con 10] 40 meq PO DAILY 05/20/21 [History]


Spironolactone [Aldactone] 100 mg PO DAILY 05/20/21 [History]


diphenhydrAMINE HCL [Diphenhydramine HCl] 25 mg PO DAILY 05/20/21 [History]











Past Medical History


Gastrointestinal History: Reports: Cirrhosis


Genitourinary History: Reports: Other (See Below)


Other Genitourinary History: dysfuncitonal uterine bleeding.  atrophic vaginitis


Musculoskeletal History: Reports: Other (See Below)


Other Musculoskeletal History: pain in lower extremities


Psychiatric History: Reports: Addiction


Dermatologic History: Reports: Other (See Below)


Other Dermatologic History: rash





- Infectious Disease History


Infectious Disease History: Reports: Chicken Pox





- Past Surgical History


Female  Surgical History: Reports: Other (See Below)


Other Female  Surgeries/Procedures: abnormal pap





Social & Family History





- Family History


Family Medical History: No Pertinent Family History


Cardiac: Reports: Hypertension, Pacemaker


Respiratory: Reports: Sleep Apnea





- Tobacco Use


Tobacco Use Status *Q: Current Every Day Tobacco User


Years of Tobacco use: 20


Packs/Tins Daily: 0.5





- Caffeine Use


Caffeine Use: Reports: Coffee, Soda





- Recreational Drug Use


Recreational Drug Use: No





- Living Situation & Occupation


Living situation: Reports: 


Occupation: Unemployed (living with parents currently)





ED ROS GENERAL





- Review of Systems


Review Of Systems: See Below


Constitutional: Reports: Malaise, Weakness, Fatigue


HEENT: Reports: No Symptoms


Respiratory: Reports: No Symptoms


Cardiovascular: Reports: No Symptoms


Endocrine: Reports: No Symptoms


GI/Abdominal: Reports: Abdominal Pain, Diarrhea, Nausea, Vomiting.  Denies: 

Hematemesis, Hematochezia, Melena


: Reports: No Symptoms


Musculoskeletal: Reports: Leg Pain, Muscle Pain, Muscle Stiffness


Skin: Reports: No Symptoms


Neurological: Reports: No Symptoms


Psychiatric: Reports: Anxiety


Hematologic/Lymphatic: Reports: No Symptoms


Immunologic: Reports: No Symptoms





ED EXAM, GENERAL





- Physical Exam


Exam: See Below


Exam Limited By: No Limitations


General Appearance: Alert, WD/WN, No Apparent Distress


Eye Exam: Bilateral Eye: PERRL


Throat/Mouth: Normal Inspection, Normal Lips, Other (oral mucosa dry.)


Head: Atraumatic, Normocephalic


Neck: Normal Inspection, Supple, Non-Tender, Full Range of Motion


Respiratory/Chest: No Respiratory Distress, Lungs Clear, No Accessory Muscle 

Use, Chest Non-Tender


Cardiovascular: Normal Peripheral Pulses, Regular Rate, Rhythm


GI/Abdominal: Soft, No Distention, Hepatomegaly, Other (diffusely tender 

throughout. ).  No: Mass


 (Female) Exam: Deferred


Rectal (Female) Exam: Deferred


Extremities: Normal Inspection, Normal Range of Motion, Non-Tender, No Pedal 

Edema, Normal Capillary Refill


Neurological: Alert, Oriented, CN II-XII Intact, Normal Cognition, Normal 

Reflexes, No Motor/Sensory Deficits


Psychiatric: Normal Affect, Normal Mood


Skin Exam: Warm, Dry, Intact, Pallor





Course





- Vital Signs


Last Recorded V/S: 


                                Last Vital Signs











Temp  37.2 C   05/20/21 11:39


 


Pulse  97   05/20/21 11:39


 


Resp  16   05/20/21 11:39


 


BP  111/42 L  05/20/21 11:39


 


Pulse Ox  98   05/20/21 11:39














- Orders/Labs/Meds


Orders: 


                               Active Orders 24 hr











 Category Date Time Status


 


 Sodium Chloride 0.9% [Normal Saline] 1,000 ml Med  05/20/21 12:00 Active





 IV ASDIRECTED   


 


 Sodium Chloride 0.9% [Saline Flush] Med  05/20/21 11:45 Active





 10 ml FLUSH ASDIRECTED PRN   


 


 Peripheral IV Insertion Adult [OM.PC] Routine Oth  05/20/21 11:46 Ordered








                                Medication Orders





Sodium Chloride (Normal Saline)  1,000 mls @ 1,000 mls/hr IV ASDIRECTED MIREILLE


   Last Infusion: 05/20/21 13:40  Dose: 400 mls/hr


   Documented by: SHAMAR


   Admin: 05/20/21 13:14  Dose: 1,000 mls/hr


   Documented by: JANI


   Infusion: 05/20/21 13:06  Dose: 1,000 mls/hr


   Documented by: JANI


   Admin: 05/20/21 12:06  Dose: 1,000 mls/hr


   Documented by: JANI


Sodium Chloride (Sodium Chloride 0.9% 10 Ml Syringe)  10 ml FLUSH ASDIRECTED PRN


   PRN Reason: Keep Vein Open








Labs: 


                                Laboratory Tests











  05/20/21 05/20/21 05/20/21 Range/Units





  12:18 12:18 12:18 


 


WBC  7.3    (4.0-10.0)  x10^3/uL


 


RBC  2.47 L    (4.00-5.50)  x10^6/uL


 


Hgb  9.0 L    (12.0-16.0)  g/dL


 


Hct  26.4 L    (33.0-47.0)  %


 


MCV  106.9 H    (78.0-93.0)  fL


 


MCH  36.4 H    (26.0-32.0)  pg


 


MCHC  34.1    (32.0-36.0)  g/dL


 


RDW Coeff of Ana  16.5 H    (10.0-15.0)  %


 


Plt Count  120 L    (130-400)  x10^3/uL


 


Neut % (Auto)  72.2    (50.0-80.0)  %


 


Lymph % (Auto)  11.4 L    (25.0-50.0)  %


 


Mono % (Auto)  15.4 H    (2.0-11.0)  %


 


Eos % (Auto)  0.7    (0.0-4.0)  %


 


Baso % (Auto)  0.3    (0.2-1.2)  %


 


PT   15.9 H   (9.9-12.5)  SEC


 


INR   1.4 L   (2.0-3.5)  


 


APTT     (25.6-32.8)  SEC


 


Sodium    132 L  (136-145)  mmol/L


 


Potassium    4.4  (3.5-5.1)  mmol/L


 


Chloride    101  ()  mmol/L


 


Carbon Dioxide    23  (21-32)  mmol/L


 


Anion Gap    12.4  (5-15)  mmol/L


 


BUN    17  (7-18)  mg/dL


 


Creatinine    1.0  (0.55-1.02)  mg/dL


 


Est Cr Clr Drug Dosing    56.83  mL/min


 


Estimated GFR (MDRD)    58  


 


Glucose    101 H  (70-99)  mg/dL


 


Calcium    9.3  (8.5-10.1)  mg/dL


 


Corrected Calcium    10.7 H  (8.5-10.1)  mg/dL


 


Magnesium    1.3 L  (1.8-2.4)  mg/dL


 


Total Bilirubin    6.0 H  (0.2-1.0)  mg/dL


 


AST    59 H  (15-37)  U/L


 


ALT    23  (14-59)  U/L


 


Alkaline Phosphatase    89  ()  U/L


 


Total Protein    7.3  (6.4-8.2)  g/dL


 


Albumin    2.3 L  (3.4-5.0)  g/dL


 


Globulin    5.0  


 


Albumin/Globulin Ratio    0.46  


 


Amylase    39  ()  U/L


 


Lipase    195  ()  U/L


 


Urine Color     (YELLOW)  


 


Urine Appearance     (CLEAR)  


 


Urine pH     (5.0-8.0)  


 


Ur Specific Gravity     


 


Urine Protein     (NEGATIVE)  mg/dL


 


Urine Glucose (UA)     (NEGATIVE)  mg/dL


 


Urine Ketones     (NEGATIVE)  mg/dL


 


Urine Occult Blood     (NEGATIVE)  


 


Urine Nitrite     (NEGATIVE)  


 


Urine Bilirubin     (NEGATIVE)  


 


Urine Urobilinogen     (0.2)  EU/dL


 


Ur Leukocyte Esterase     (NEGATIVE)  


 


Urine RBC     (NOT SEEN)  /HPF


 


Urine WBC     (NOT SEEN)  /HPF


 


Ur Squamous Epith Cells     (NOT SEEN)  /HPF


 


Amorphous Sediment     


 


Urine Bacteria     (NOT SEEN)  /HPF


 


Urine Mucus     (NOT SEEN)  /LPF


 


Urine Opiates Screen     (NEGATIVE)  


 


Ur Buprenorphine Scrn     (NEGATIVE)  


 


Ur Oxycodone Screen     (NEGATIVE)  


 


Ur EDDP (Meth Metab)     (NEGATIVE)  


 


Urine Methadone Screen     (NEGATIVE)  


 


Ur Barbiturates Screen     (NEGATIVE)  


 


Ur Tricyclics Screen     (NEGATIVE)  


 


Ur Phencyclidine Scrn     (NEGATIVE)  


 


Ur Amphetamine Screen     (NEGATIVE)  


 


U Methamphetamines Scrn     (NEGATIVE)  


 


Urine MDMA Screen     (NEGATIVE)  


 


U Benzodiazepines Scrn     (NEGATIVE)  


 


U Cocaine Metab Screen     (NEGATIVE)  


 


U Marijuana (THC) Screen     (NEGATIVE)  














  05/20/21 05/20/21 05/20/21 Range/Units





  12:18 13:00 13:00 


 


WBC     (4.0-10.0)  x10^3/uL


 


RBC     (4.00-5.50)  x10^6/uL


 


Hgb     (12.0-16.0)  g/dL


 


Hct     (33.0-47.0)  %


 


MCV     (78.0-93.0)  fL


 


MCH     (26.0-32.0)  pg


 


MCHC     (32.0-36.0)  g/dL


 


RDW Coeff of Ana     (10.0-15.0)  %


 


Plt Count     (130-400)  x10^3/uL


 


Neut % (Auto)     (50.0-80.0)  %


 


Lymph % (Auto)     (25.0-50.0)  %


 


Mono % (Auto)     (2.0-11.0)  %


 


Eos % (Auto)     (0.0-4.0)  %


 


Baso % (Auto)     (0.2-1.2)  %


 


PT     (9.9-12.5)  SEC


 


INR     (2.0-3.5)  


 


APTT  31.2    (25.6-32.8)  SEC


 


Sodium     (136-145)  mmol/L


 


Potassium     (3.5-5.1)  mmol/L


 


Chloride     ()  mmol/L


 


Carbon Dioxide     (21-32)  mmol/L


 


Anion Gap     (5-15)  mmol/L


 


BUN     (7-18)  mg/dL


 


Creatinine     (0.55-1.02)  mg/dL


 


Est Cr Clr Drug Dosing     mL/min


 


Estimated GFR (MDRD)     


 


Glucose     (70-99)  mg/dL


 


Calcium     (8.5-10.1)  mg/dL


 


Corrected Calcium     (8.5-10.1)  mg/dL


 


Magnesium     (1.8-2.4)  mg/dL


 


Total Bilirubin     (0.2-1.0)  mg/dL


 


AST     (15-37)  U/L


 


ALT     (14-59)  U/L


 


Alkaline Phosphatase     ()  U/L


 


Total Protein     (6.4-8.2)  g/dL


 


Albumin     (3.4-5.0)  g/dL


 


Globulin     


 


Albumin/Globulin Ratio     


 


Amylase     ()  U/L


 


Lipase     ()  U/L


 


Urine Color   Orange H   (YELLOW)  


 


Urine Appearance   Slightly cloudy H   (CLEAR)  


 


Urine pH   6.0   (5.0-8.0)  


 


Ur Specific Gravity   1.025   


 


Urine Protein   30 H   (NEGATIVE)  mg/dL


 


Urine Glucose (UA)   Negative   (NEGATIVE)  mg/dL


 


Urine Ketones   Negative   (NEGATIVE)  mg/dL


 


Urine Occult Blood   Negative   (NEGATIVE)  


 


Urine Nitrite   Negative   (NEGATIVE)  


 


Urine Bilirubin   Large H   (NEGATIVE)  


 


Urine Urobilinogen   2.0 H   (0.2)  EU/dL


 


Ur Leukocyte Esterase   Negative   (NEGATIVE)  


 


Urine RBC   0-5   (NOT SEEN)  /HPF


 


Urine WBC   0-5   (NOT SEEN)  /HPF


 


Ur Squamous Epith Cells   Few H   (NOT SEEN)  /HPF


 


Amorphous Sediment   Occasional   


 


Urine Bacteria   Rare   (NOT SEEN)  /HPF


 


Urine Mucus   Few H   (NOT SEEN)  /LPF


 


Urine Opiates Screen    Positive H  (NEGATIVE)  


 


Ur Buprenorphine Scrn    Negative  (NEGATIVE)  


 


Ur Oxycodone Screen    Positive H  (NEGATIVE)  


 


Ur EDDP (Meth Metab)    Negative  (NEGATIVE)  


 


Urine Methadone Screen    Negative  (NEGATIVE)  


 


Ur Barbiturates Screen    Negative  (NEGATIVE)  


 


Ur Tricyclics Screen    Negative  (NEGATIVE)  


 


Ur Phencyclidine Scrn    Negative  (NEGATIVE)  


 


Ur Amphetamine Screen    Negative  (NEGATIVE)  


 


U Methamphetamines Scrn    Negative  (NEGATIVE)  


 


Urine MDMA Screen    Negative  (NEGATIVE)  


 


U Benzodiazepines Scrn    Negative  (NEGATIVE)  


 


U Cocaine Metab Screen    Negative  (NEGATIVE)  


 


U Marijuana (THC) Screen    Negative  (NEGATIVE)  











Meds: 


Medications











Generic Name Dose Route Start Last Admin





  Trade Name Freq  PRN Reason Stop Dose Admin


 


Sodium Chloride  1,000 mls @ 1,000 mls/hr  05/20/21 12:00  05/20/21 13:40





  Normal Saline  IV   400 mls/hr





  ASDIRECTED MIREILLE   Infusion


 


Sodium Chloride  10 ml  05/20/21 11:45 





  Sodium Chloride 0.9% 10 Ml Syringe  FLUSH  





  ASDIRECTED PRN  





  Keep Vein Open  














Discontinued Medications














Generic Name Dose Route Start Last Admin





  Trade Name Freq  PRN Reason Stop Dose Admin


 


Magnesium Sulfate 2 gm/ Premix  50 mls @ 50 mls/hr  05/20/21 13:18  05/20/21 

13:43





  IV  05/20/21 14:17  50 mls/hr





  ONETIME ONE   Administration


 


Morphine Sulfate  4 mg  05/20/21 11:46  05/20/21 12:07





  Morphine 4 Mg/Ml Syringe  IVPUSH  05/20/21 11:47  4 mg





  ONETIME ONE   Administration


 


Morphine Sulfate  4 mg  05/20/21 14:42  05/20/21 14:50





  Morphine 4 Mg/Ml Syringe  IVPUSH  05/20/21 14:43  4 mg





  ONETIME ONE   Administration


 


Ondansetron HCl  4 mg  05/20/21 11:46  05/20/21 12:07





  Ondansetron 4 Mg/2 Ml Sdv  IVPUSH  05/20/21 11:47  4 mg





  ONETIME ONE   Administration














- Re-Assessments/Exams


Free Text/Narrative Re-Assessment/Exam: 


IV access established. Pt. was given a total of 2 liters of NS. She was given 

magnesium 2gm IV. Pt. was given zofran IV 4mg and reported significant 

improvement in nausea/vomiting. She was also given 2 small doses of IV morphine 

with good results in pain control.





Departure





- Departure


Time of Disposition: 19:38


Disposition: Home, Self-Care 01


Clinical Impression: 


 Nausea & vomiting, Diarrhea








- Discharge Information


Instructions:  Ondansetron oral dissolving tablet, Nausea and Vomiting, Adult, 

Easy-to-Read


Referrals: 


Bobbi Montanez PA-C [Primary Care Provider] - 


Forms:  ED Department Discharge


Additional Instructions: 


Zofran ODT


1 every 6 hours as needed for nausea/vomiting





Oxycodone 5mg every 4-6 hours as needed for pain. Take the zofran before you 

take the oxycodone.





Once the nausea and vomiting is controlled, you can take the oxycodone for pain.







Follow-up in clinic as planned tomorrow.














Sepsis Event Note (ED)





- Evaluation


Sepsis Screening Result: No Definite Risk





- Focused Exam


Vital Signs: 


                                   Vital Signs











  Temp Pulse Resp BP Pulse Ox


 


 05/20/21 11:39  37.2 C  97  16  111/42 L  98














- My Orders


Last 24 Hours: 


My Active Orders





05/20/21 11:45


Sodium Chloride 0.9% [Saline Flush]   10 ml FLUSH ASDIRECTED PRN 





05/20/21 11:46


Peripheral IV Insertion Adult [OM.PC] Routine 





05/20/21 12:00


Sodium Chloride 0.9% [Normal Saline] 1,000 ml IV ASDIRECTED 














- Assessment/Plan


Last 24 Hours: 


My Active Orders





05/20/21 11:45


Sodium Chloride 0.9% [Saline Flush]   10 ml FLUSH ASDIRECTED PRN 





05/20/21 11:46


Peripheral IV Insertion Adult [OM.PC] Routine 





05/20/21 12:00


Sodium Chloride 0.9% [Normal Saline] 1,000 ml IV ASDIRECTED 











Plan: 





Zofran ODT


1 every 6 hours as needed for nausea/vomiting





Oxycodone 5mg every 4-6 hours as needed for pain. Take the zofran before you 

take the oxycodone.





Once the nausea and vomiting is controlled, you can take the oxycodone for pain.







Follow-up in clinic as planned tomorrow.

## 2021-05-21 ENCOUNTER — HOSPITAL ENCOUNTER (EMERGENCY)
Dept: HOSPITAL 50 - VM.ED | Age: 51
Discharge: ANOTHER HOSPITAL | End: 2021-05-21
Payer: MEDICAID

## 2021-05-21 DIAGNOSIS — Z79.899: ICD-10-CM

## 2021-05-21 DIAGNOSIS — K52.9: Primary | ICD-10-CM

## 2021-05-21 LAB
ANION GAP SERPL CALC-SCNC: 16.1 MMOL/L (ref 5–15)
CHLORIDE SERPL-SCNC: 99 MMOL/L (ref 98–107)
SODIUM SERPL-SCNC: 130 MMOL/L (ref 136–145)

## 2021-05-21 PROCEDURE — 99285 EMERGENCY DEPT VISIT HI MDM: CPT

## 2021-05-21 PROCEDURE — 96376 TX/PRO/DX INJ SAME DRUG ADON: CPT

## 2021-05-21 PROCEDURE — 85730 THROMBOPLASTIN TIME PARTIAL: CPT

## 2021-05-21 PROCEDURE — 85025 COMPLETE CBC W/AUTO DIFF WBC: CPT

## 2021-05-21 PROCEDURE — 96375 TX/PRO/DX INJ NEW DRUG ADDON: CPT

## 2021-05-21 PROCEDURE — 96374 THER/PROPH/DIAG INJ IV PUSH: CPT

## 2021-05-21 PROCEDURE — 83605 ASSAY OF LACTIC ACID: CPT

## 2021-05-21 PROCEDURE — 80053 COMPREHEN METABOLIC PANEL: CPT

## 2021-05-21 PROCEDURE — 74177 CT ABD & PELVIS W/CONTRAST: CPT

## 2021-05-21 PROCEDURE — 85610 PROTHROMBIN TIME: CPT

## 2021-05-21 PROCEDURE — 36415 COLL VENOUS BLD VENIPUNCTURE: CPT

## 2021-05-21 RX ADMIN — SODIUM CHLORIDE ONE MG: 9 INJECTION, SOLUTION INTRAVENOUS at 04:35

## 2021-05-21 RX ADMIN — IOPAMIDOL ONE ML: 612 INJECTION, SOLUTION INTRAVENOUS at 05:55

## 2021-05-21 RX ADMIN — VANCOMYCIN HYDROCHLORIDE SCH MG: 125 CAPSULE ORAL at 07:28

## 2021-05-21 NOTE — CT
______________________________________________________________________________   

  

[id number] CT/CT Abdomen Pelvis W IV  

EXAM: CT Abdomen Pelvis W IV  

   

 CLINICAL DATA: ABDOMINAL PAIN, LOWER GI BLEEDING  

   

 COMPARISON STUDY: March 1, 2021.  

   

 FINDINGS:  

   

 Hepatomegaly with diffusely heterogeneous parenchymal attenuation and  

 enhancement. No focal lesion.  

   

 Gallbladder is distended with wall thickening as well. Common bile duct is  

 normal in caliber. No sonographically evident cholelithiasis or  

 choledocholithiasis.  

   

 Pancreas, adrenal glands, and kidneys are unremarkable.  

   

 Spleen is enlarged. There are varices in the upper abdomen, including  

 gastroesophageal varices. Recanalization of umbilical vein.  

   

 Trace amount of perihepatic ascites.  

   

 Wall thickening diffusely throughout the colon most prominent in the descending  

 and sigmoid segments. Findings extend into the rectum as well. There is  

 stranding in the mesorectal soft tissues.  

   

 There is prominent vasculature in the mesorectal soft tissues and wall of the  

 low rectum. Findings are most consistent with varices and possibly account for  

 GI bleeding.  

   

 17 x 17 x 14 mm exophytic subserosal fibroid arising from the dorsal aspect of  

 the uterus. Uterus and adnexal regions are unremarkable. Urinary bladder is  

 unremarkable.  

   

 Spondylosis. No acute fracture or compression deformity. Benign-appearing  

 enchondroma in the left femoral neck.  

   

 IMPRESSION:  

   

 Hepatocellular disease, including hepatomegaly and portal hypertension. Findings  

 include gastroesophageal varices and recanalization of umbilical vein.  

   

 Wall thickening throughout the colon and rectum, most prominent in the  

 descending and sigmoid segments of the colon. Stranding in the mesorectal soft  

 tissues as well.  

   

 Colonic wall thickening can be seen as sequela of portal hypertension. However,  

 involvement of the distal colon and rectum suggests possible changes of  

 underlying proctocolitis. Correlate for infectious or inflammatory etiology.  

   

 Distended gallbladder without evidence of acute cholecystitis.  

   

 Other findings are described above.  

   

   

 Electronically signed by Parmjit Downing MD on 5/21/2021 8:28 AM  

   

  

Parmjit Downing MD                 

 05/21/21 0831    

  

Thank you for allowing us to participate in the care of your patient.

## 2021-05-21 NOTE — EDM.PDOC
<Amie Zurita - Last Filed: 05/21/21 13:26>





ED HPI GENERAL MEDICAL PROBLEM





- General


Chief Complaint: Gastrointestinal Problem


Stated Complaint: Abdominal pain, diarrhea


Time Seen by Provider: 05/21/21 04:00





- Related Data


                                    Allergies











Allergy/AdvReac Type Severity Reaction Status Date / Time


 


No Known Allergies Allergy   Verified 05/21/21 04:53











Home Meds: 


                                    Home Meds





Triamcinolone Acetonide [Kenalog 0.1% Crm] 1 applic TOP BID PRN 02/04/20 

[History]


Betamethasone Dipropionate [Diprosone 0.05% Oint] 1 applic TOP BID 02/25/21 

[History]


Nicotine [Habitrol] 21 mg TRDERM DAILY@1800 #30 patch 02/26/21 [Rx]


Clobetasol [Clobetasol Propionate 0.05%] 1 applic TOP BID 05/20/21 [History]


Famotidine 10 mg PO DAILY 05/20/21 [History]


Furosemide [Lasix] 20 mg PO DAILY 05/20/21 [History]


Gabapentin [Neurontin] 600 mg PO TID 05/20/21 [History]


Lactulose [Cephulac] 10 gm PO BID 05/20/21 [History]


Menthol/Methyl Salicylate [Icy Hot Cream] 1 applic TP ASDIRECTED 05/20/21 

[History]


Potassium Chloride [Klor-Con 10] 40 meq PO DAILY 05/20/21 [History]


Spironolactone [Aldactone] 100 mg PO DAILY 05/20/21 [History]


diphenhydrAMINE HCL [Diphenhydramine HCl] 25 mg PO DAILY 05/20/21 [History]











Departure





- Departure


Disposition: DC/Tfer to [facility]


Clinical Impression: 


 Colitis








- Discharge Information


Referrals: 


PCP,None [Ordering Only Provider] - 


Forms:  ED Department Discharge, Interfacility Transfer EMTALA





- Assessment/Plan


Plan: 





Patient continues to remain in the emergency department due to bed constraints 

in [place].  Patient has been resting comfortably with no major issues.  Patient 

recently developed worsening pain while in the emergency department. Orders for 

hydromorphone 1 mg IV was provided. VSS. Assessment findings unchanged from 

previous provider report. Will continue to monitor and assess as needed.  





<Tadeo Lundy - Last Filed: 05/23/21 08:24>





ED HPI GENERAL MEDICAL PROBLEM





- General


Source of Information: Reports: Patient


History Limitations: Reports: No Limitations





- History of Present Illness


INITIAL COMMENTS - FREE TEXT/NARRATIVE: 





Pt. presents to ER with complaints of continued abdominal pain nausea, vomiting,

and diarrhea. She was seen in the ER yesterday for the same. At that time, she 

was treated for dehydration with several liters of normal saline, IV pain, 

medications, antiemetics and was discharged home after several hours. Pt. had 

lab work done at that time. She had no elevated white count our other worrisome 

lab findings far outside of her baseline. Pt. does have a history of alcoholic 

liver disease and cirrhosis. She underwent paracentesis on 4/26 under US 

guidance on 4/26 and is scheduled to undergo colonoscopy and and EGD. She has an

appointment with GI tomorrow for intermittent abdominal pain. She had an 

abdominal US on 3/26 and found to have ascites and hepatomegaly. Gallbladder 

wall was thickened and sludge was noted. It was otherwise normal.


She states that since she was discharged from the hospital this afternoon, she 

has developed onset of bloody stools. She states that she has one BM tonight 

that was accompanied with maroon colored blood. Denies any clots. Denies any 

coffee ground or black, tarry stools. She does have a history of esophageal va

rices. 


Pt. states that the abdominal discomfort is diffuse in nature and does not 

localize to one specific area. 


Onset Date: 05/16/21


Location: Reports: Abdomen, Generalized


  ** whole abdomen


Pain Score (Numeric/FACES): 9





Past Medical History


Gastrointestinal History: Reports: Cirrhosis


Genitourinary History: Reports: Other (See Below)


Other Genitourinary History: dysfuncitonal uterine bleeding.  atrophic vaginitis


Musculoskeletal History: Reports: Other (See Below)


Other Musculoskeletal History: pain in lower extremities


Psychiatric History: Reports: Addiction


Dermatologic History: Reports: Other (See Below)


Other Dermatologic History: rash





- Infectious Disease History


Infectious Disease History: Reports: Chicken Pox





- Past Surgical History


Female  Surgical History: Reports: Other (See Below)


Other Female  Surgeries/Procedures: abnormal pap





Social & Family History





- Family History


Family Medical History: No Pertinent Family History


Cardiac: Reports: Hypertension, Pacemaker


Respiratory: Reports: Sleep Apnea





- Caffeine Use


Caffeine Use: Reports: Coffee, Soda





- Living Situation & Occupation


Living situation: Reports: 


Occupation: Unemployed (living with parents currently)





ED ROS GENERAL





- Review of Systems


Review Of Systems: See Below


Constitutional: Reports: No Symptoms


HEENT: Reports: No Symptoms


Respiratory: Reports: No Symptoms


Cardiovascular: Reports: No Symptoms


Endocrine: Reports: No Symptoms


GI/Abdominal: Reports: Abdominal Pain, Bloody Stool


: Reports: No Symptoms


Musculoskeletal: Reports: No Symptoms


Skin: Reports: No Symptoms


Neurological: Reports: No Symptoms


Psychiatric: Reports: No Symptoms


Hematologic/Lymphatic: Reports: No Symptoms


Immunologic: Reports: No Symptoms





ED EXAM, GENERAL





- Physical Exam


Exam: See Below


Exam Limited By: No Limitations


General Appearance: Alert, WD/WN, No Apparent Distress


Respiratory/Chest: No Respiratory Distress, Lungs Clear, Normal Breath Sounds, 

No Accessory Muscle Use, Chest Non-Tender


Cardiovascular: Normal Peripheral Pulses, Regular Rate, Rhythm, No Edema, No 

JVD, No Murmur


Peripheral Pulses: 4+: Radial (L)


GI/Abdominal: Soft, Non-Tender, No Distention, No Mass


 (Female) Exam: Deferred


Rectal (Female) Exam: Deferred


Extremities: Normal Inspection, Normal Range of Motion, Non-Tender, No Pedal 

Edema, Normal Capillary Refill


Neurological: Alert, Oriented, CN II-XII Intact, Normal Cognition, Normal Gait, 

Normal Reflexes, No Motor/Sensory Deficits


Psychiatric: Normal Affect, Anxious, Tearful


Skin Exam: Warm, Dry, Intact, No Rash, Jaundice


Lymphatic: No Adenopathy





Course





- Vital Signs


Last Recorded V/S: 


                                Last Vital Signs











Temp  37.4 C   05/21/21 04:00


 


Pulse  106 H  05/21/21 04:00


 


Resp  20   05/21/21 04:00


 


BP  121/48 L  05/21/21 04:00


 


Pulse Ox  100   05/21/21 04:00














- Orders/Labs/Meds


Labs: 


                                Laboratory Tests











  05/21/21 05/21/21 05/21/21 Range/Units





  04:30 04:30 04:30 


 


WBC  9.1    (4.0-10.0)  x10^3/uL


 


RBC  2.38 L    (4.00-5.50)  x10^6/uL


 


Hgb  8.6 L    (12.0-16.0)  g/dL


 


Hct  25.5 L    (33.0-47.0)  %


 


MCV  107.1 H    (78.0-93.0)  fL


 


MCH  36.1 H    (26.0-32.0)  pg


 


MCHC  33.7    (32.0-36.0)  g/dL


 


RDW Coeff of Ana  16.5 H    (10.0-15.0)  %


 


Plt Count  111 L    (130-400)  x10^3/uL


 


Neut % (Auto)  68.9    (50.0-80.0)  %


 


Lymph % (Auto)  13.1 L    (25.0-50.0)  %


 


Mono % (Auto)  16.6 H    (2.0-11.0)  %


 


Eos % (Auto)  1.0    (0.0-4.0)  %


 


Baso % (Auto)  0.4    (0.2-1.2)  %


 


PT   17.6 H   (9.9-12.5)  SEC


 


INR   1.6 L   (2.0-3.5)  


 


APTT     (25.6-32.8)  SEC


 


Sodium    130 L  (136-145)  mmol/L


 


Potassium    4.1  (3.5-5.1)  mmol/L


 


Chloride    99  ()  mmol/L


 


Carbon Dioxide    19 L  (21-32)  mmol/L


 


Anion Gap    16.1 H  (5-15)  mmol/L


 


BUN    21 H  (7-18)  mg/dL


 


Creatinine    1.1 H  (0.55-1.02)  mg/dL


 


Est Cr Clr Drug Dosing    TNP  


 


Estimated GFR (MDRD)    52  


 


Glucose    103 H  (70-99)  mg/dL


 


Lactic Acid     (0.4-2.0)  mmol/L


 


Calcium    8.8  (8.5-10.1)  mg/dL


 


Corrected Calcium    10.2 H  (8.5-10.1)  mg/dL


 


Total Bilirubin    8.5 H  (0.2-1.0)  mg/dL


 


AST    55 H  (15-37)  U/L


 


ALT    21  (14-59)  U/L


 


Alkaline Phosphatase    84  ()  U/L


 


Total Protein    7.5  (6.4-8.2)  g/dL


 


Albumin    2.3 L  (3.4-5.0)  g/dL


 


Globulin    5.2  


 


Albumin/Globulin Ratio    0.44  














  05/21/21 05/21/21 05/21/21 Range/Units





  04:30 04:30 08:25 


 


WBC     (4.0-10.0)  x10^3/uL


 


RBC     (4.00-5.50)  x10^6/uL


 


Hgb     (12.0-16.0)  g/dL


 


Hct     (33.0-47.0)  %


 


MCV     (78.0-93.0)  fL


 


MCH     (26.0-32.0)  pg


 


MCHC     (32.0-36.0)  g/dL


 


RDW Coeff of Ana     (10.0-15.0)  %


 


Plt Count     (130-400)  x10^3/uL


 


Neut % (Auto)     (50.0-80.0)  %


 


Lymph % (Auto)     (25.0-50.0)  %


 


Mono % (Auto)     (2.0-11.0)  %


 


Eos % (Auto)     (0.0-4.0)  %


 


Baso % (Auto)     (0.2-1.2)  %


 


PT     (9.9-12.5)  SEC


 


INR     (2.0-3.5)  


 


APTT  31.9    (25.6-32.8)  SEC


 


Sodium     (136-145)  mmol/L


 


Potassium     (3.5-5.1)  mmol/L


 


Chloride     ()  mmol/L


 


Carbon Dioxide     (21-32)  mmol/L


 


Anion Gap     (5-15)  mmol/L


 


BUN     (7-18)  mg/dL


 


Creatinine     (0.55-1.02)  mg/dL


 


Est Cr Clr Drug Dosing     


 


Estimated GFR (MDRD)     


 


Glucose     (70-99)  mg/dL


 


Lactic Acid   4.0 H*  2.1 H*  (0.4-2.0)  mmol/L


 


Calcium     (8.5-10.1)  mg/dL


 


Corrected Calcium     (8.5-10.1)  mg/dL


 


Total Bilirubin     (0.2-1.0)  mg/dL


 


AST     (15-37)  U/L


 


ALT     (14-59)  U/L


 


Alkaline Phosphatase     ()  U/L


 


Total Protein     (6.4-8.2)  g/dL


 


Albumin     (3.4-5.0)  g/dL


 


Globulin     


 


Albumin/Globulin Ratio     











Meds: 


Medications














Discontinued Medications














Generic Name Dose Route Start Last Admin





  Trade Name Freq  PRN Reason Stop Dose Admin


 


Hydromorphone HCl  1 mg  05/21/21 05:50  05/21/21 06:08





  Hydromorphone 1 Mg/Ml Syringe  IVPUSH  05/21/21 05:51  1 mg





  ONETIME ONE   Administration


 


Hydromorphone HCl  1 mg  05/21/21 13:26  05/21/21 13:29





  Hydromorphone 1 Mg/Ml Syringe  IVPUSH  05/21/21 13:27  1 mg





  ONETIME ONE   Administration


 


Sodium Chloride  1,000 mls @ 250 mls/hr  05/21/21 04:30  05/21/21 04:30





  Normal Saline  IV   250 mls/hr





  ASDIRECTED MIRE[facility]LE   Administration


 


Piperacillin Sod/Tazobactam  100 mls @ 200 mls/hr  05/21/21 06:20  05/21/21 

13:27





  Sod 3.375 gm/ Sodium Chloride  IV  05/21/21 06:49  Not Given





  STAT ONE  


 


Sodium Chloride  1,000 mls @ 150 mls/hr  05/21/21 07:30  05/21/21 07:58





  Normal Saline  IV   150 mls/hr





  ASDIRECTED [facility]   Administration


 


Iopamidol  100 ml  05/21/21 05:52  05/21/21 05:55





  Iopamidol 612 Mg/Ml 100 Ml Bottle  IVPUSH  05/21/21 05:53  100 ml





  ONETIME ONE   Administration


 


Morphine Sulfate  4 mg  05/21/21 04:19  05/21/21 04:37





  Morphine 4 Mg/Ml Syringe  IVPUSH  05/21/21 04:20  4 mg





  ONETIME ONE   Administration


 


Nicotine  21 mg  05/21/21 16:45  05/21/21 16:44





  Nicotine 21 Mg/24 Hr Patch  TRDERM  05/21/21 16:46  21 mg





  ONETIME ONE   Administration


 


Ondansetron HCl  4 mg  05/21/21 04:19  05/21/21 04:35





  Ondansetron 4 Mg/2 Ml Sdv  IVPUSH  05/21/21 04:20  4 mg





  ONETIME ONE   Administration


 


Sodium Chloride  10 ml  05/21/21 04:13 





  Sodium Chloride 0.9% 10 Ml Syringe  FLUSH  





  ASDIRECTED PRN  





  Keep Vein Open  


 


Vancomycin HCl  125 mg  05/21/21 08:00  05/21/21 07:28





  Vancomycin 125 Mg Cap  PO   125 mg





  QID MIREILLE   Administration














- Radiology Interpretation


Free Text/Narrative:: 





pancolitis noted from mid transverse colon to sigmoid colon.





- Re-Assessments/Exams


Free Text/Narrative Re-Assessment/Exam: 


IV access established. Pt. was given a liter of normal saline, zofran 4mg IV. 

Pain was treated with IV morphine and dilaudid. Discussed findings with Dr. Carbone, hospitalist at [facility]. He advised holding off on IV antibiotics at this 

time and starting the patient on oral vancomycin. [facility] is on diversion and 

accepted the patient but she will need to be held here until after discharges 

this AM.





Departure





- Departure


Time of Disposition: 17:20